# Patient Record
Sex: FEMALE | Race: WHITE | NOT HISPANIC OR LATINO | URBAN - METROPOLITAN AREA
[De-identification: names, ages, dates, MRNs, and addresses within clinical notes are randomized per-mention and may not be internally consistent; named-entity substitution may affect disease eponyms.]

---

## 2020-11-04 ENCOUNTER — APPOINTMENT (RX ONLY)
Dept: URBAN - METROPOLITAN AREA CLINIC 292 | Facility: CLINIC | Age: 40
Setting detail: DERMATOLOGY
End: 2020-11-04

## 2020-11-04 VITALS — TEMPERATURE: 97.4 F

## 2020-11-04 DIAGNOSIS — L65.9 NONSCARRING HAIR LOSS, UNSPECIFIED: ICD-10-CM

## 2020-11-04 PROCEDURE — 99202 OFFICE O/P NEW SF 15 MIN: CPT

## 2020-11-04 PROCEDURE — ? COUNSELING

## 2020-11-04 ASSESSMENT — LOCATION DETAILED DESCRIPTION DERM: LOCATION DETAILED: RIGHT MEDIAL FRONTAL SCALP

## 2020-11-04 ASSESSMENT — LOCATION SIMPLE DESCRIPTION DERM: LOCATION SIMPLE: RIGHT SCALP

## 2020-11-04 ASSESSMENT — LOCATION ZONE DERM: LOCATION ZONE: SCALP

## 2020-11-04 NOTE — PROCEDURE: COUNSELING
Detail Level: Detailed
Patient Specific Counseling (Will Not Stick From Patient To Patient): Will request lab work from PCP, Dr. Vazquez and papopath.  \\nPt recently lost 25 pounds in 2 month and was started on supplements by papopath.  No rx medications.  No recent hormonal contraceptive use or change.   No recent pregnancy.  \\nPt advised that we don’t know a why she is losing hair now.  Could be multifactorial and need to obtain lab results.   Certainly significant weight loss could cause telogen effluvium.   Pt was a vegetarian previously, add in some meat but then was on a ketogenic diet. \\n\\nHair pull today close to normal.  Hair is thinner overall suggestive of androgenetic component as well.  Advised to take women’s rogaine. Pt reluctant as she wants to avoid chemicals. \\nConsider scalp biopsy in future.

## 2020-11-24 ENCOUNTER — APPOINTMENT (RX ONLY)
Dept: URBAN - METROPOLITAN AREA CLINIC 292 | Facility: CLINIC | Age: 40
Setting detail: DERMATOLOGY
End: 2020-11-24

## 2020-11-24 DIAGNOSIS — L65.9 NONSCARRING HAIR LOSS, UNSPECIFIED: ICD-10-CM

## 2020-11-24 PROCEDURE — ? ORDER TESTS

## 2020-11-24 NOTE — PROCEDURE: ORDER TESTS
Performing Laboratory: [id number]
Bill For Surgical Tray: no
Billing Type: Third-Party Bill
Expected Date Of Service: 11/24/2020

## 2020-12-15 ENCOUNTER — APPOINTMENT (RX ONLY)
Dept: URBAN - METROPOLITAN AREA CLINIC 94 | Facility: CLINIC | Age: 40
Setting detail: DERMATOLOGY
End: 2020-12-15

## 2020-12-15 VITALS — TEMPERATURE: 96.6 F

## 2020-12-15 DIAGNOSIS — L64.8 OTHER ANDROGENIC ALOPECIA: ICD-10-CM | Status: INADEQUATELY CONTROLLED

## 2020-12-15 PROCEDURE — ? TREATMENT REGIMEN

## 2020-12-15 PROCEDURE — 99202 OFFICE O/P NEW SF 15 MIN: CPT

## 2020-12-15 PROCEDURE — ? COUNSELING

## 2020-12-15 ASSESSMENT — LOCATION DETAILED DESCRIPTION DERM: LOCATION DETAILED: MID-OCCIPITAL SCALP

## 2020-12-15 ASSESSMENT — LOCATION SIMPLE DESCRIPTION DERM: LOCATION SIMPLE: POSTERIOR SCALP

## 2020-12-15 ASSESSMENT — LOCATION ZONE DERM: LOCATION ZONE: SCALP

## 2020-12-15 NOTE — HPI: HAIR LOSS
Previous Labs: Yes
How Did The Hair Loss Occur?: gradual in onset
Additional History: \\nPatient is afebrile at presentation and denies any symptoms consistent with COVID-19 infection. He/she has no sick contacts and has had no recent travel.
When Were The Labs Drawn? (Drawn...): 10/2020
Lab Details: Normal

## 2020-12-15 NOTE — PROCEDURE: TREATMENT REGIMEN
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 Detailed discussion of potential treatment options to include 5% minoxidil 1-2x/day, viviscal supplementation, spironolactone and PRP (cosmetic charge discussed). Patient will consider her options and call if interested.
Detail Level: Detailed